# Patient Record
Sex: MALE | Race: AMERICAN INDIAN OR ALASKA NATIVE
[De-identification: names, ages, dates, MRNs, and addresses within clinical notes are randomized per-mention and may not be internally consistent; named-entity substitution may affect disease eponyms.]

---

## 2018-08-13 ENCOUNTER — HOSPITAL ENCOUNTER (EMERGENCY)
Dept: HOSPITAL 5 - ED | Age: 38
Discharge: HOME | End: 2018-08-13
Payer: MEDICARE

## 2018-08-13 VITALS — SYSTOLIC BLOOD PRESSURE: 121 MMHG | DIASTOLIC BLOOD PRESSURE: 76 MMHG

## 2018-08-13 DIAGNOSIS — N20.1: Primary | ICD-10-CM

## 2018-08-13 LAB
ALBUMIN SERPL-MCNC: 4.4 G/DL (ref 3.9–5)
ALT SERPL-CCNC: 128 UNITS/L (ref 7–56)
BACTERIA #/AREA URNS HPF: (no result) /HPF
BAND NEUTROPHILS # (MANUAL): 0 K/MM3
BILIRUB UR QL STRIP: (no result)
BLOOD UR QL VISUAL: (no result)
BUN SERPL-MCNC: 10 MG/DL (ref 9–20)
BUN/CREAT SERPL: 10 %
CALCIUM SERPL-MCNC: 9.7 MG/DL (ref 8.4–10.2)
CAOX CRY #/AREA URNS HPF: (no result) /[HPF]
HCT VFR BLD CALC: 48.7 % (ref 35.5–45.6)
HEMOLYSIS INDEX: 20
HGB BLD-MCNC: 16.7 GM/DL (ref 11.8–15.2)
MCH RBC QN AUTO: 30 PG (ref 28–32)
MCHC RBC AUTO-ENTMCNC: 34 % (ref 32–34)
MCV RBC AUTO: 87 FL (ref 84–94)
MUCOUS THREADS #/AREA URNS HPF: (no result) /HPF
MYELOCYTES # (MANUAL): 0 K/MM3
PH UR STRIP: 6 [PH] (ref 5–7)
PLATELET # BLD: 245 K/MM3 (ref 140–440)
PROMYELOCYTES # (MANUAL): 0 K/MM3
RBC # BLD AUTO: 5.59 M/MM3 (ref 3.65–5.03)
RBC #/AREA URNS HPF: > 182 /HPF (ref 0–6)
TOTAL CELLS COUNTED BLD: 100
UROBILINOGEN UR-MCNC: < 2 MG/DL (ref ?–2)
WBC #/AREA URNS HPF: 19 /HPF (ref 0–6)

## 2018-08-13 PROCEDURE — 99284 EMERGENCY DEPT VISIT MOD MDM: CPT

## 2018-08-13 PROCEDURE — 96374 THER/PROPH/DIAG INJ IV PUSH: CPT

## 2018-08-13 PROCEDURE — 80053 COMPREHEN METABOLIC PANEL: CPT

## 2018-08-13 PROCEDURE — 81001 URINALYSIS AUTO W/SCOPE: CPT

## 2018-08-13 PROCEDURE — 85007 BL SMEAR W/DIFF WBC COUNT: CPT

## 2018-08-13 PROCEDURE — 36415 COLL VENOUS BLD VENIPUNCTURE: CPT

## 2018-08-13 PROCEDURE — 85025 COMPLETE CBC W/AUTO DIFF WBC: CPT

## 2018-08-13 PROCEDURE — 96361 HYDRATE IV INFUSION ADD-ON: CPT

## 2018-08-13 PROCEDURE — 96375 TX/PRO/DX INJ NEW DRUG ADDON: CPT

## 2018-08-13 PROCEDURE — 74177 CT ABD & PELVIS W/CONTRAST: CPT

## 2018-08-13 NOTE — EMERGENCY DEPARTMENT REPORT
ED Abdominal Pain HPI





- General


Chief Complaint: Abdominal Pain


Stated Complaint: LOWER ABD PAIN


Time Seen by Provider: 08/13/18 10:56


Source: patient, EMS


Mode of arrival: Ambulatory


Limitations: No Limitations





- History of Present Illness


Initial Comments: 





This is a 38-year-old male nontoxic, well nourished in appearance, no acute 

signs of distress presents to the ED with c/o of nausea and vomiting and 

abdominal pain 1 day.   Patient describes vomiting as food content and yellow 

gastric acid.  Patient describes abdominal pain as cramping and aching with 

level of 8/10 mostly in the LLQ.  Patient denies chest pain, short of breath, 

fever, chills, headache, stiff neck, numbness or tingling.  Patient denies any 

radiation of pain.  Patient also stated had some diarrhea.  Patient denies any 

constipation. Patient denies any recent travels.  Patient denies any allergies 

or PMH.


MD Complaint: abdominal pain


-: days(s) (1)


Location: LLQ


Radiation: none


Migration to: no migration


Severity: mild


Severity scale (0 -10): 8


Quality: cramping, aching


Improves With: nothing


Worsens With: nothing


Associated Symptoms: nausea, vomiting, diarrhea.  denies: fever, chills, 

constipation, dysuria, hematemesis, hematochezia, melena, hematuria, anorexia, 

syncope





- Related Data


 Previous Rx's











 Medication  Instructions  Recorded  Last Taken  Type


 


Acetaminophen/Codeine [Tylenol 1 tab PO Q6H PRN #12 tab 08/13/18 Unknown Rx





/Codeine # 3 tab]    


 


Ibuprofen [Motrin] 600 mg PO Q8H PRN #30 tablet 08/13/18 Unknown Rx


 


Tamsulosin [Flomax] 0.4 mg PO QDAY #5 cap 08/13/18 Unknown Rx











 Allergies











Allergy/AdvReac Type Severity Reaction Status Date / Time


 


No Known Allergies Allergy   Unverified 08/13/18 10:11














ED Review of Systems


ROS: 


Stated complaint: LOWER ABD PAIN


Other details as noted in HPI





Constitutional: denies: chills, fever


Eyes: denies: eye pain, eye discharge, vision change


ENT: denies: ear pain, throat pain


Respiratory: denies: cough, shortness of breath, wheezing


Cardiovascular: denies: chest pain, palpitations


Endocrine: no symptoms reported


Gastrointestinal: abdominal pain, nausea, vomiting, diarrhea.  denies: 

constipation


Genitourinary: denies: urgency, dysuria


Musculoskeletal: denies: back pain, joint swelling, arthralgia


Skin: denies: rash, lesions


Neurological: denies: headache, weakness, paresthesias


Psychiatric: denies: anxiety, depression


Hematological/Lymphatic: denies: easy bleeding, easy bruising





ED Past Medical Hx





- Past Medical History


Previous Medical History?: Yes


Additional medical history: sarcoidosis, elevated liver enzymes, glaucoma





- Surgical History


Additional Surgical History: eye surgery x2





- Social History


Smoking Status: Never Smoker


Substance Use Type: Marijuana





- Medications


Home Medications: 


 Home Medications











 Medication  Instructions  Recorded  Confirmed  Last Taken  Type


 


Acetaminophen/Codeine [Tylenol 1 tab PO Q6H PRN #12 tab 08/13/18  Unknown Rx





/Codeine # 3 tab]     


 


Ibuprofen [Motrin] 600 mg PO Q8H PRN #30 tablet 08/13/18  Unknown Rx


 


Tamsulosin [Flomax] 0.4 mg PO QDAY #5 cap 08/13/18  Unknown Rx














ED Physical Exam





- General


Limitations: No Limitations


General appearance: alert, in no apparent distress





- Head


Head exam: Present: atraumatic, normocephalic





- Eye


Eye exam: Present: normal appearance


Pupils: Present: normal accommodation





- ENT


ENT exam: Present: normal exam, mucous membranes moist





- Neck


Neck exam: Present: normal inspection, full ROM.  Absent: tenderness, 

meningismus, lymphadenopathy





- Respiratory


Respiratory exam: Present: normal lung sounds bilaterally.  Absent: respiratory 

distress, wheezes, rales, rhonchi, stridor





- Cardiovascular


Cardiovascular Exam: Present: regular rate, normal rhythm, normal heart sounds.

  Absent: bradycardia, tachycardia, irregular rhythm, systolic murmur, 

diastolic murmur, rubs, gallop





- GI/Abdominal


GI/Abdominal exam: Present: soft, tenderness (LLQ), normal bowel sounds.  Absent

: distended, guarding, rebound, rigid, diminished bowel sounds





- Expanded GI/Abdominal Exam


  ** Expanded


GI/Abdominal exam: Absent: psoas sign, obturator sign, heel tap sign, Santizo's 

sign, Rovsing's sign, tenderness at [place]bur[place]'s Point, ascites





- Rectal


Rectal exam: Present: deferred





- Extremities Exam


Extremities exam: Present: normal inspection, full ROM, normal capillary 

refill.  Absent: tenderness





- Back Exam


Back exam: Present: normal inspection, full ROM.  Absent: tenderness, CVA 

tenderness (R), CVA tenderness (L), muscle spasm, paraspinal tenderness, 

vertebral tenderness, rash noted





- Neurological Exam


Neurological exam: Present: alert, oriented X3, normal gait





- Psychiatric


Psychiatric exam: Present: normal affect, normal mood





- Skin


Skin exam: Present: warm, dry, intact, normal color.  Absent: rash





ED Course


 Vital Signs











  08/13/18 08/13/18 08/13/18





  10:11 11:04 13:35


 


Temperature 99.7 F H  


 


Pulse Rate 85  72


 


Respiratory 20 16 16





Rate   


 


Blood Pressure 131/87  


 


Blood Pressure   121/76





[Left]   


 


O2 Sat by Pulse 96  98





Oximetry   














- Reevaluation(s)


Reevaluation #1: 





08/13/18 12:45


Patient is speaking in full sentences with no signs of distress noted.





- Consultations


Consultation #1: 





08/13/18 12:45


Patient has been consulted with YASMIN Stokes about patient history, physical 

exam, and labs/CT and examined and screened patient and agrees to ED plan of 

care.





ED Medical Decision Making





- Lab Data


Result diagrams: 


 08/13/18 Unknown





 08/13/18 Unknown





- Medical Decision Making





This is a 38-year-old male that presents with right kidney stone.  Patient is 

stable and was examined by me and Dr. Cam. There is slight abdominal 

tenderness. Negative signs of symptoms of appendicitis.  Labs obtained with 

elevated AST/ALT. Patient stated he is aware of his "liver enzymes being 

elevated" due to alcohol intake. Patient was instructed to see a GI doctor for 

further evaluation. UA obtained. CT of abdomen obtained and dictated by the 

radiologist. Patient is notified of the report with no questions noted by the 

patient. Vital signs are stable prior to discharge.  PAtient received 1L of 

Normal saline and zofran in the ED which patient stated symptoms has resolved 

and subsided.  A by mouth challenge has been obtained and patient tolerated 

well with no nausea vomiting.  Patient was notified of strict precautions of 

appendicitis symptoms and to return to the ED if symptoms occurs as soon as 

possible.  Patient was also instructed to Follow-up with a primary care doctor 

in 3-5 days or if symptoms worsen and continue return to emergency room as soon 

as possible.  At time of discharge, the patient does not seem toxic or ill in 

appearance.  No acute signs of distress noted.  Patient agrees to discharge 

treatment plan of care.  No further questions noted by the patient.


Critical care attestation.: 


If time is entered above; I have spent that time in minutes in the direct care 

of this critically ill patient, excluding procedure time.








ED Disposition


Clinical Impression: 


 Left ureteral stone





Disposition: DC-01 TO HOME OR SELFCARE


Is pt being admited?: No


Does the pt Need Aspirin: No


Condition: Stable


Instructions:  Acetaminophen/Codeine (By mouth), Ibuprofen (By mouth), 

Tamsulosin (By mouth), Kidney Stones (ED)


Additional Instructions: 


Follow-up with a primary care/gastroentrologist doctor in 3-5 days or if 

symptoms worsen and continue return to emergency room as soon as possible. 


Do not operate any machinery while taking Tylenol with codeine as this may 

cause drowsiness.


Prescriptions: 


Acetaminophen/Codeine [Tylenol /Codeine # 3 tab] 1 tab PO Q6H PRN #12 tab


 PRN Reason: Pain , Severe (7-10)


Ibuprofen [Motrin] 600 mg PO Q8H PRN #30 tablet


 PRN Reason: Pain


Tamsulosin [Flomax] 0.4 mg PO QDAY #5 cap


Referrals: 


AUGUST MUÑOZ MD [Primary Care Provider] - 3-5 Days


ALEJANDRO GERBER MD [Staff Physician] - 3-5 Days


[facility] [Outside] - 3-5 Days


[facility] [Provider Group] - 3-5 Days


Forms:  Work/School Release Form(ED)

## 2018-08-13 NOTE — CAT SCAN REPORT
CT ABDOMEN PELVIS WITH CONTRAST:



HISTORY:  Left lower quadrant pain with nausea, vomiting and diarrhea.



COMPARISON: none.



TECHNIQUE:  Helical CT in 1.25mm intervals following IV contrast. 

Sagittal and coronal reconstructions. 





FINDINGS:



Lung bases: Normal.



Liver: Normal.



Biliary system: Normal.



Pancreas: Normal.



Spleen: Normal.



Kidneys/ureters/bladder: A 4.6 mm calculus is identified in the distal 

left ureter just before the UVJ. Minimal left hydronephrosis is 

identified. 2 calyceal stones are identified at the inferior pole of 

the right kidney measuring 3 mm and 9 mm. No right ureteral stones. The 

kidneys are within normal limits. No evidence for pyelonephritis. 

Normal bladder and prostate gland.



Adrenal glands: Normal.



Aorta: Normal.



Intestines: Within normal limits given no oral contrast was 

administered.



Appendix: Normal.



Ascites: None.



Adenopathy: None.



Musculoskeletal: Normal.





IMPRESSION:

4.6 mm distal left ureteral stone, minimally obstructing.

Right renal stones as described, nonobstructing.

No acute inflammatory process is appreciated.

## 2018-08-13 NOTE — EMERGENCY DEPARTMENT REPORT
Blank Doc





- Documentation


Documentation: 





Patient is a 38-year-old Male who is complaining of 3 days of left lower 

quadrant pain with nausea and vomiting 6 as well as some loose stools.  

Patient states he has not had any large watery bowel movements but states the 

stools are loose within normal.  Patient denies any fever.  On focused physical 

exam does have some left lower quadrant tenderness.  Patient be moved to a 

treatment room for IV fluids and patient will have a CT of abdomen and pelvis 

with IV contrast done as well as laboratory studies.                                                                                                                                                                                                                                                                                                                                                                                                              [facility] History & Physical    Subjective:      Patient ID:   Michael Hawkins  78 y.o. male  1939  [address] [place]      Chief Complaint:   Anemia follow up    HPI:  78 y.o. male with diagnosis of Fe deficiency anemia, hx of AVM's, cauterized.  Also diverticular dx and radiation proctitis.   Hx of prostate cancer treated with radium seeds.  Also cancer of lip treated with radiation 2010.  Rectal bleeding x's 3 years, radiation proctitis, H/H 6/17.    On synthroid, decrease plavix qod.  Dr. Davis.    Hx HTN, CAD, cholesterol, thyroid, PE, DVT, bipolar hx, sleep apnea, prostate cancer.  Carotid artery dx R > L.    Hx R renal cancer, cryosurgery, Dr. Barton  Coronary stent x's 2 8/2016.    Smoke 1 1/2 ppd x's 25 years, through 1985.  ETOH no  Job  x's 20 years.    Mom bone cancer  Dad heart dx    ROS:   GEN: normal without any fever, night sweats or weight loss  HEENT: See HPI.   no HA's, sore throat, stiff neck, changes in vision  CV: See HPI.   no CP, SOB, PND, ORTEGA or orthopnea  PULM: normal with no SOB, cough, hemoptysis, sputum or pleuritic pain  GI: See HPI.  : See HPI.  BREAST: normal with no mass, discharge, pain  SKIN: normal with no rash, erythema, bruising, or swelling     Past Medical History:   Diagnosis Date    Basal cell carcinoma     Hypertension     Nonmelanoma skin cancer     Prostate cancer      Past Surgical History:   Procedure Laterality Date    FOREARM HARDWARE REMOVAL         Review of patient's allergies indicates:  No Known Allergies  Social History     Social  History    Marital status:      Spouse name: N/A    Number of children: N/A    Years of education: N/A     Occupational History    Not on file.     Social History Main Topics    Smoking status: Former Smoker     Types: Cigarettes     Quit date: 9/13/1992    Smokeless tobacco: Never Used    Alcohol use No    Drug use: No    Sexual activity: Not on file     Other Topics Concern    Not on file     Social History Narrative    No narrative on file         Current Outpatient Prescriptions:     alendronate (FOSAMAX) 70 MG tablet, Take 70 mg by mouth every 7 days., Disp: , Rfl:     amlodipine (NORVASC) 5 MG tablet, Take 5 mg by mouth once daily., Disp: , Rfl:     aspirin 81 MG Chew, Take 81 mg by mouth once daily., Disp: , Rfl:     carbamazepine (TEGRETOL) 200 mg tablet, Take 200 mg by mouth 3 (three) times daily., Disp: , Rfl:     cholecalciferol, vitamin D3, 400 unit Tab, Take by mouth once daily., Disp: , Rfl:     cloNIDine (CATAPRES) 0.1 MG tablet, Take 0.1 mg by mouth 2 (two) times daily., Disp: , Rfl:     clopidogrel (PLAVIX) 75 mg tablet, Take 75 mg by mouth once daily., Disp: , Rfl:     co-enzyme Q-10 30 mg capsule, Take 100 mg by mouth 3 (three) times daily., Disp: , Rfl:     cyclobenzaprine (FLEXERIL) 10 MG tablet, Take 10 mg by mouth 3 (three) times daily as needed for Muscle spasms., Disp: , Rfl:     fish oil-omega-3 fatty acids 300-1,000 mg capsule, Take 2 g by mouth once daily., Disp: , Rfl:     FLUNISOLIDE INHL, Inhale into the lungs., Disp: , Rfl:     fosinopril (MONOPRIL) 40 MG tablet, Take 40 mg by mouth once daily., Disp: , Rfl:     isosorbide mononitrate (IMDUR) 30 MG 24 hr tablet, Take 30 mg by mouth once daily., Disp: , Rfl:     LACTOBAC NO.41/BIFIDOBACT NO.7 (PROBIOTIC-10 ORAL), Take by mouth., Disp: , Rfl:     lactulose (CHRONULAC) 10 gram/15 mL solution, , Disp: , Rfl:     levothyroxine (SYNTHROID) 150 MCG tablet, Take 150 mcg by mouth once daily., Disp: , Rfl:      "metoprolol tartrate (LOPRESSOR) 50 MG tablet, Take 50 mg by mouth 2 (two) times daily., Disp: , Rfl:     multivitamin (ONE DAILY MULTIVITAMIN) per tablet, Take 1 tablet by mouth once daily., Disp: , Rfl:     nitroGLYCERIN 0.4 MG/DOSE TL SPRY (NITROLINGUAL) 400 mcg/spray spray, Place 1 spray under the tongue every 5 (five) minutes as needed for Chest pain., Disp: , Rfl:     NUFERA 125 mg-1 mg-170 mg-1,000 unit Tab, TK 1 T PO QD, Disp: , Rfl: 2    oxybutynin (DITROPAN-XL) 10 MG 24 hr tablet, Take 10 mg by mouth once daily., Disp: , Rfl:     POTASSIUM CITRATE ORAL, Take by mouth., Disp: , Rfl:     psyllium (METAMUCIL) packet, Take 1 packet by mouth once daily., Disp: , Rfl:     quetiapine (SEROQUEL XR) 400 MG Tb24, Take by mouth once daily., Disp: , Rfl:     rosuvastatin (CRESTOR) 40 MG Tab, , Disp: , Rfl:     temazepam (RESTORIL) 22.5 MG capsule, Take 22.5 mg by mouth nightly as needed for Insomnia., Disp: , Rfl:     UROCIT-K 10 10 mEq (1,080 mg) TbSR, , Disp: , Rfl:           Objective:   Vitals:  Blood pressure 102/67, pulse 64, temperature 97.9 °F (36.6 °C), resp. rate 18, height 5' 9" (1.753 m), weight 89.6 kg (197 lb 8 oz).    Physical Examination:   GEN: no apparent distress, comfortable  HEAD: atraumatic and normocephalic  EYES: no pallor, no icterus  ENT:no pharyngeal erythema, external ears WNL; no nasal discharge; no thrush  NECK: no masses, thyroid normal, trachea midline, no LAD/LN's, supple  CV: RRR with no murmur; normal pulse; normal S1 and S2; no pedal edema  CHEST: Normal respiratory effort; CTAB; normal breath sounds; no wheeze or crackles  ABDOM: nontender and nondistended; soft; normal bowel sounds; no rebound/guarding  MUSC/Skeletal: ROM normal; no crepitus; joints normal  EXTREM: no clubbing, cyanosis, inflammation or swelling  SKIN: no rashes, lesions, ulcers, petechia  : no alvarez  NEURO: grossly intact; motor/sensory WNL;  no tremors  PSYCH: normal mood, affect and behavior  LYMPH: " normal cervical, supraclavicular, axillary and groin LN's      Labs:   Lab Results   Component Value Date    WBC 4.8 (L) 01/24/2018    HGB 12.9 (L) 01/24/2018    HCT 38.0 (L) 01/24/2018    MCV 92.0 01/24/2018     01/24/2018    CMP  Sodium   Date Value Ref Range Status   04/21/2017 141 134 - 144 mmol/L      Potassium   Date Value Ref Range Status   04/21/2017 4.0 3.5 - 5.0 mmol/L      Chloride   Date Value Ref Range Status   04/21/2017 103 98 - 110 mmol/L      CO2   Date Value Ref Range Status   04/21/2017 29.0 22.8 - 31.6 mmol/L      Glucose   Date Value Ref Range Status   04/21/2017 167 (H) 70 - 99 mg/dL      BUN, Bld   Date Value Ref Range Status   04/21/2017 29 (H) 8 - 20 mg/dL      Creatinine   Date Value Ref Range Status   04/21/2017 1.45 (H) 0.60 - 1.40 mg/dL      Calcium   Date Value Ref Range Status   04/21/2017 9.3 7.7 - 10.4 mg/dL      Total Protein   Date Value Ref Range Status   04/21/2017 6.7 6.0 - 8.2 g/dL      Albumin   Date Value Ref Range Status   04/21/2017 4.1 3.1 - 4.7 g/dL      Total Bilirubin   Date Value Ref Range Status   04/21/2017 0.4 0.3 - 1.0 mg/dL      Alkaline Phosphatase   Date Value Ref Range Status   04/21/2017 51 40 - 104 IU/L      AST   Date Value Ref Range Status   04/21/2017 23 10 - 40 IU/L      I have reviewed all available lab results and radiology reports.    Radiology/Diagnostic Studies:    Hgb 12.8, WBC 3,700, plt cnt 71,000.  MCV 91.  B 12 1136, TSH 6.29,  PSA 0.02.      All lab results and imaging results have been reviewed and discussed with the patient    Assessment:   (1) 78 y.o. male with diagnosis of Fe deficiency anemia, 2nd GI bleeding, 2nd AVM's 2nd Rad Rx.       Better after cauterization of AVM's.    (2)Prostate cancer hx, renal cancer, lower lip cancer.    (3)CAD, carotid dx          No diagnosis found.    Plan:     No Follow-up on file.